# Patient Record
Sex: FEMALE | Race: WHITE
[De-identification: names, ages, dates, MRNs, and addresses within clinical notes are randomized per-mention and may not be internally consistent; named-entity substitution may affect disease eponyms.]

---

## 2017-05-28 ENCOUNTER — HOSPITAL ENCOUNTER (EMERGENCY)
Dept: HOSPITAL 24 - ER | Age: 67
Discharge: HOME | End: 2017-05-28
Payer: COMMERCIAL

## 2017-05-28 VITALS
DIASTOLIC BLOOD PRESSURE: 68 MMHG | DIASTOLIC BLOOD PRESSURE: 68 MMHG | SYSTOLIC BLOOD PRESSURE: 101 MMHG | SYSTOLIC BLOOD PRESSURE: 101 MMHG | SYSTOLIC BLOOD PRESSURE: 101 MMHG | SYSTOLIC BLOOD PRESSURE: 101 MMHG | SYSTOLIC BLOOD PRESSURE: 101 MMHG | DIASTOLIC BLOOD PRESSURE: 68 MMHG | DIASTOLIC BLOOD PRESSURE: 68 MMHG | DIASTOLIC BLOOD PRESSURE: 68 MMHG

## 2017-05-28 VITALS — BODY MASS INDEX: 22.4 KG/M2
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
## 2017-05-28 DIAGNOSIS — S39.012A: ICD-10-CM

## 2017-05-28 DIAGNOSIS — S23.3XXA: Primary | ICD-10-CM
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
## 2017-05-28 PROCEDURE — 96372 THER/PROPH/DIAG INJ SC/IM: CPT

## 2017-05-28 PROCEDURE — 72110 X-RAY EXAM L-2 SPINE 4/>VWS: CPT

## 2017-05-28 PROCEDURE — 99283 EMERGENCY DEPT VISIT LOW MDM: CPT

## 2017-05-28 PROCEDURE — 93005 ELECTROCARDIOGRAM TRACING: CPT

## 2017-05-28 PROCEDURE — 72072 X-RAY EXAM THORAC SPINE 3VWS: CPT

## 2017-05-28 PROCEDURE — 93010 ELECTROCARDIOGRAM REPORT: CPT

## 2017-05-28 PROCEDURE — 99282 EMERGENCY DEPT VISIT SF MDM: CPT

## 2017-05-28 NOTE — DR.GENAD
HPI





- PCP


Primary Care Physician: lizet lima





- HPI Comment


HPI Comment: INCREASING LOWER AND UPPER BACK PAIN. HISTORY OF BACK INJURY 

PREVIOUSLY.





- Complaint/Symptoms


Chief Complaint Doctors Comments: INJURY UPPER AND LOWER BACK 5 DAYS AGO.


Chief Complaint:: pt hurt back in the garden tuesday afternoon and it has got 

worse since then





- Nurses notes reviewed


Nurses Notes Review: Yes





- Source


History Provided: Patient





- Mode of Arrival


Mode of Arrival: Ambulatory





- Timing


Onset of Chief Complaint: 05/23/17


Came on: Suddenly





- Duration


Duration: Constant


Duration: Days





- Severity


Severity: Moderate





PMH





- PMH


Past Medical History: Yes


Past Medical History: Anxiety, Dyslipidemia, Migraines, Hypertension


Past Surgical History: Yes


Surgical History: Angioplasty/Stents, Cholecystectomy, Hysterectomy, Ortho 

Surgery





- Family History


History of Family Medical Conditions: Yes


Family Medical History: Cancer, Heart Failure, Hypertension





- Social History


Does patient currently use any type of tobacco product: No


Have you used tobacco products in the last 12 months: No


Type of Tobacco Use: None


Does any household member use tobacco: No


Alcohol Use: None


Do you use any recreational Drugs:: No


Lives With: Spouse


Lives Where: Home





- infectious screening


In the last 2 months have you had wt loss of >10#?: NO


Have you had fever, night sweats or hemotysis?: No


Have you traveled outside the country in the last 6 months?: No


Isolation: Standard





ROS





- Review of Systems


Constitutional: No Symptoms Reported.  negative: Chills, Fever


Eyes: No Symptoms Reported.  negative: Eye Pain, Discharge


ENTM: No Symptoms Reported.  negative: Ear Pain, Nose Discharge, Nose Congestion

, Throat Pain


Respiratoy: No Symptoms Reported.  negative: Productive Cough, Non-Productive 

Cough, Short of Breath, Wheezing, Hemoptysis


Cardiovascular: Chest Pain (BACK OF CHEST HURTING.)


Gastrointestinal/Abdominal: No Symptoms Reported.  negative: Abdominal Pain, 

Constipation, Nausea


Genitourinary: No Symptoms Reported





PE





- Vital Signs


Vitals: 


 





Temperature                      97.4 F


Pulse Rate                       69


Respiratory Rate                 18


Blood Pressure [Left Arm]        125/82


Blood Pressure                   101/68


O2 Sat by Pulse Oximetry         99











- Discharge Plan


Condition: Stable


Prescriptions: 


Cyclobenzaprine HCl [FLEXERIL 10 MG *] 10 mg PO TID #20 tab


Oxycodone/Acet 5 mg/325 mg [PERCOCET 5/325 MG *] 1 tab PO Q6H PRN #15 tab


 PRN Reason: Severe Pain





- Follow ups/Referrals


Follow ups/Referrals: 


HARPREET LIMA [Primary Care Provider] - 3 days





- Instructions


Instructions:  Thoracic Strain, Easy-to-Read, Lumbosacral Strain


Additional Instructions: 


RETURN TO ED IF WORSE.

## 2017-05-28 NOTE — RAD
HISTORY:  Low back pain.



Study: Five view lumbar spine series.



Comparison: November 3, 2014.



Findings:



There is mild dextroscoliosis of the lumbar spine similar to the comparison study. There is narrowin
g of the L5-S1 intervertebral disc space without significant endplate sclerosis. Facet joint arthrop
athy at L5-S1 is noted as well. Otherwise, vertebral body and disk space heights are maintained.  No
 evidence for acute fracture can be identified. Cholecystectomy clips project over the right upper q
uadrant of the abdomen. There is a small nonspecific metallic density projecting over the left pelvi
s. There are bilateral pelvic phleboliths.



IMPRESSION:

 

1. Dextroscoliosis of the lumbar spine with degenerative disc disease and facet joint arthropathy at
 L5-S1 similar to the comparison study. 



2. No acute or destructive bony abnormality is evident.



3. Small nonspecific metallic density projecting over the left pelvis. Clinical correlation is reque
sted.





Reported By:Electronically Signed by TESS RAMIREZ MD at 5/28/2017 12:49:30 PM

## 2017-05-28 NOTE — RAD
HISTORY:  Mid back pain.



Study: AP and lateral views of the thoracic spine including swimmer's view.



Comparison: November 3, 2014.



Findings:



Normal alignment of the thoracic spine is maintained. No destructive bony lesion is evident. There i
s moderate spondylosis of the mid thoracic spine. Vertebral body and disk space heights are maintain
ed. No evidence for acute fracture can be identified.



IMPRESSION:

 

Moderate spondylosis of the mid thoracic spine without acute or destructive bony abnormality evident
. 





Reported By:Electronically Signed by TESS RAMIREZ MD at 5/28/2017 12:45:58 PM

## 2017-11-02 ENCOUNTER — HOSPITAL ENCOUNTER (OUTPATIENT)
Dept: HOSPITAL 24 - RAD | Age: 67
End: 2017-11-02
Attending: NURSE PRACTITIONER
Payer: COMMERCIAL

## 2017-11-02 DIAGNOSIS — Z12.31: Primary | ICD-10-CM

## 2017-11-02 PROCEDURE — 77067 SCR MAMMO BI INCL CAD: CPT

## 2017-11-14 ENCOUNTER — HOSPITAL ENCOUNTER (OUTPATIENT)
Dept: HOSPITAL 24 - RAD | Age: 67
End: 2017-11-14
Attending: NURSE PRACTITIONER
Payer: COMMERCIAL

## 2017-11-14 DIAGNOSIS — R92.8: Primary | ICD-10-CM

## 2017-11-14 PROCEDURE — 77065 DX MAMMO INCL CAD UNI: CPT

## 2017-11-14 NOTE — MG
HISTORY:  Abnormal screening mammography with left breast developing asymmetry



Left breast digital diagnostic mammography with CAD. 



Comparison: Multiple priors dating back to October 27, 2010



FINDINGS: 



Spot magnification and mL views of the left breast were obtained.  Heterogeneously dense fibroglandul
ar tissue is seen to be present with the previously described asymmetry dispersing with intervening f
oci of lucency and assuming a similar appearance as compared to the more remote priors most compatibl
e with benign fibroglandular parenchyma.  No suspicious architectural distortion, mass or clustered m
icrocalcifications can be observed to suggest malignancy.  No skin thickening or nipple retraction is
 appreciated.   No pathological lymphadenopathy can be identified. 

 

IMPRESSION:  NO RADIOGRAPHIC EVIDENCE OF MALIGNANCY.  

 

ACR CATEGORY I - NEGATIVE EXAM.

 

FOLLOW-UP EXAM 1 YEAR. 



Diagnostic CAD was utilized and reviewed.



* 0 (ZERO) - ASSESSMENT INCOMPLETE; ADDITIONAL IMAGING IS NEEDED. 

* 1/1 (ONE) - NEGATIVE. 

* 2/II (TWO) - BENIGN FINDINGS.

 * 3/III (THREE) - PROBABLY BENIGN FINDING; SHORT INTERVAL FOLLOW-UP

SUGGESTED.

 * 4/IV (FOUR) - SUSPICIOUS ABNORMALITY; BIOPSY SHOULD BE CONSIDERED.

 * 5/V - HIGHLY SUSPICIOUS OF MALIGNANCY; BIOPSY SHOULD BE PERFORMED.

 

A NEGATIVE X-RAY REPORT SHOULD NOT DELAY BIOPSY IF A DOMINANT OR

CLINICALLY SUSPICIOUS MASS IS PRESENT; 4 TO 8 PERCENT OF CANCERS ARE NOT IDENTIFIED BY X-RAY.  A NEGA
TIVE REPORT MAY REINFORCE THE CLINICAL IMPRESSION.  ADENOSIS AND DENSE BREASTS MAY OBSCURE AN UNDERLY
ING NEOPLASM.







Reported By:Electronically Signed by BELLE FALK MD at 11/14/2017 2:48:46 PM

## 2018-02-12 ENCOUNTER — HOSPITAL ENCOUNTER (OUTPATIENT)
Dept: HOSPITAL 24 - RAD | Age: 68
End: 2018-02-12
Attending: NURSE PRACTITIONER
Payer: COMMERCIAL

## 2018-02-12 DIAGNOSIS — K59.09: ICD-10-CM

## 2018-02-12 DIAGNOSIS — R10.84: Primary | ICD-10-CM

## 2018-02-12 PROCEDURE — 74018 RADEX ABDOMEN 1 VIEW: CPT

## 2018-02-12 NOTE — RAD
Examination: KUB



History: Abdominal pain



Findings: Nonobstructive intestinal gas pattern. No ascites, mass formation or pathologic calcificati
on identified. Surgical clips right upper abdomen. No visceral enlargement.



Impression: No acute abdominal process demonstrated.



Reported By:Electronically Signed by MANSOOR SHERMAN MD at 2/12/2018 7:06:07 PM

## 2018-02-16 ENCOUNTER — HOSPITAL ENCOUNTER (OUTPATIENT)
Dept: HOSPITAL 24 - RAD | Age: 68
End: 2018-02-16
Attending: NURSE PRACTITIONER
Payer: COMMERCIAL

## 2018-02-16 DIAGNOSIS — R53.83: ICD-10-CM

## 2018-02-16 DIAGNOSIS — K59.09: ICD-10-CM

## 2018-02-16 DIAGNOSIS — R10.84: Primary | ICD-10-CM

## 2018-02-16 LAB
ALBUMIN SERPL BCP-MCNC: 3.1 G/DL (ref 3.4–5)
ALP SERPL-CCNC: 121 UNITS/L (ref 46–116)
ALT SERPL W P-5'-P-CCNC: 18 UNITS/L (ref 12–78)
AST SERPL W P-5'-P-CCNC: 15 UNITS/L (ref 15–37)
BUN SERPL-MCNC: 22 MG/DL (ref 7–18)
CALCIUM ALBUM COR SERPL-SCNC: (no result) MMOL/L (ref 136–145)
CALCIUM ALBUM COR SERPL-SCNC: 9.5 MG/DL (ref 8.5–10.1)
CALCIUM SERPL-MCNC: 8.8 MG/DL (ref 8.5–10.1)
CHLORIDE SERPL-SCNC: 103 MMOL/L (ref 98–107)
CO2 SERPL-SCNC: 32.4 MMOL/L (ref 21–32)
CREAT SERPL-MCNC: 0.97 MG/DL (ref 0.55–1.02)
EGFR  BLACK RACES: > 60 (ref 60–?)
PROT SERPL-MCNC: 6.6 G/DL (ref 6.4–8.2)
SODIUM SERPL-SCNC: 140 MMOL/L (ref 136–145)

## 2018-02-16 PROCEDURE — 36415 COLL VENOUS BLD VENIPUNCTURE: CPT

## 2018-02-16 PROCEDURE — 74177 CT ABD & PELVIS W/CONTRAST: CPT

## 2018-02-16 PROCEDURE — 80053 COMPREHEN METABOLIC PANEL: CPT

## 2018-02-16 NOTE — CT
HISTORY: Abdominal pain, nausea, and constipation.



Study: CT abdomen and pelvis with contrast



Comparison: CT abdomen/pelvis dated August 7, 2016.



Technique:



Multiple axial images of the abdomen and pelvis were obtained from the lung bases to the pubic symphy
sis after the administration of IV contrast.  Dose reduction techniques including Automated Exposure 
Control (AEC) and adjustment of mA and kV were utilized.





Findings:



The visualized portions of the lung bases are unremarkable.  Multiple hepatic cysts are again seen. O
therwise, the liver, spleen, pancreas, kidneys, and adrenal glands are unremarkable in their CT appea
gabriel. The gallbladder is surgically absent.  No significant mesenteric lymphadenopathy or stranding 
can be observed.  No free fluid or free air is seen within the abdomen.  The large and small bowel ar
e unremarkable. The appendix is not well seen. The uterus and ovaries are surgically absent. The blad
[place] is collapsed and not well evaluated. The osseous structures are intact. No aggressive osseous les
ions.



IMPRESSION:  No CT evidence of acute abdominal/pelvic pathology.







Reported By:Electronically Signed by TATE LOPEZ MD at 2/16/2018 1:48:40 PM

## 2018-04-30 ENCOUNTER — HOSPITAL ENCOUNTER (EMERGENCY)
Dept: HOSPITAL 24 - ER | Age: 68
Discharge: HOME | End: 2018-04-30
Payer: COMMERCIAL

## 2018-04-30 VITALS — BODY MASS INDEX: 26.6 KG/M2

## 2018-04-30 VITALS — SYSTOLIC BLOOD PRESSURE: 159 MMHG | DIASTOLIC BLOOD PRESSURE: 72 MMHG

## 2018-04-30 DIAGNOSIS — I10: ICD-10-CM

## 2018-04-30 DIAGNOSIS — R51: Primary | ICD-10-CM

## 2018-04-30 DIAGNOSIS — H11.31: ICD-10-CM

## 2018-04-30 PROCEDURE — 99283 EMERGENCY DEPT VISIT LOW MDM: CPT

## 2018-04-30 PROCEDURE — 96372 THER/PROPH/DIAG INJ SC/IM: CPT

## 2018-04-30 PROCEDURE — 99282 EMERGENCY DEPT VISIT SF MDM: CPT

## 2018-04-30 PROCEDURE — 70450 CT HEAD/BRAIN W/O DYE: CPT

## 2018-04-30 NOTE — CT
HISTORY: Headache and high blood pressure.



Study:  CT brain without contrast



Comparison: CT head dated April 27, 2015.



Technique:

Multiple axial images of the brain were obtained from the skull base to the vertex without administra
tion of IV contrast.  Dose reduction techniques including Automated Exposure Control (AEC) and adjust
ment of mA and kV were utilized.





Findings: 



Age-related cortical atrophy and chronic small vessel ischemic changes. No acute intraparenchymal hem
orrhage or mass can be identified.  No extra-axial fluid collections are seen.  No alteration in the 
attenuation of the brain parenchyma can be identified to suggest acute or subacute ischemic change.  
The ventricular system is symmetric and nondilated.  The extracranial structures are grossly unremark
able.



IMPRESSION: No acute intracranial pathology.



Reported By:Electronically Signed by TATE LOPEZ MD at 4/30/2018 4:05:31 PM

## 2018-04-30 NOTE — DR.EXTPAIN
HPI





- Time seen


Time seen: 15:30





- PCP


Primary Care Physician: lizet lima





- HPI Comment


HPI Comment: HISTORY AS BELOW.





- Complaint/Symptoms


Chief Complaint Doctor Comments: RIGHT SUBCONJUCTIVA HEMORRHAGE AND SEVERE 

HEADACHE SINCE LAST NIGHT. SAME HAPPEN TO LT EYE RECENTLY. SAW EYE DOCTOR THEN. 

BP HIGH IN ED. NO TRAUMA.


Chief Complaint:: pt stated her bp was high last night and a blood vessel 

busted in her right eye and she has a bad headache.


Self Treatment fo Chief Complaint: pt stated she took her bp meds





- Nurses notes reviewed


Nurses Notes Review: Yes





- Source


History Provided: Patient





- Mode of arrival


Mode of Arrival: Ambulatory





- Timing


Onset of Chief Complaint: 04/29/18





- Context


History of: Arthritis





- Associated signs and symptoms


Associated Signs and Symptoms: Pain (HEADACHE), Nausea, Shortness of Breath, 

Other (RIGHT SUBCONJUNCTIVA HEMORRHAGE.)





PMH





- PMH


Past Medical History: Yes


Past Medical History: Anxiety, Dyslipidemia, Migraines, Hypertension


Past Surgical History: Yes


Surgical History: Angioplasty/Stents, Cholecystectomy, Hysterectomy, Ortho 

Surgery





- Family History


History of Family Medical Conditions: Yes


Family Medical History: Cancer, Heart Failure, Hypertension





- Social History


Does patient currently use any type of tobacco product: No


Have you used tobacco products in the last 12 months: No


Type of Tobacco Use: None


Does any household member use tobacco: No


Alcohol Use: None


Do you use any recreational Drugs:: No


Lives With: Family


Lives Where: Home





- infectious screening


In the last 2 months have you had wt loss of >10#?: NO


Have you had fever, night sweats or hemotysis?: No


Have you traveled outside the country in the last 6 months?: No


Isolation: Standard





ROS





- Review of Systems


Constitutional: No Symptoms Reported.  negative: Chills, Fever


Eyes: negative: Eye Pain, Blurred Vision, Discharge


ENTM: Nose Congestion.  negative: Ear Pain, Nose Discharge, Throat Pain


Respiratoy: Non-Productive Cough, Short of Breath.  negative: Wheezing, 

Hemoptysis


Cardiovascular: No Symptoms Reported


Gastrointestinal/Abdominal: Nausea, Vomiting.  negative: Abdominal Pain


Genitourinary: No Symptoms Reported


Neurological: Headache, Weakness, Dizziness


Musculoskeletal: Muscle Pain


Integumentary: No Symptoms Reported


Hematologic/Lymphatic: No Symptoms Reported


Endocrine: No Symptoms Reported


All Other Systems: Reviewed and Negative





PE





- Vital Signs


Vitals: 


 





Temperature                      98.3 F


Pulse Rate [Left Brachial]       59


Pulse Rate                       64


Respiratory Rate                 16


Blood Pressure [Left Arm]        159/72


Blood Pressure                   136/81


O2 Sat by Pulse Oximetry         99











- General


Limitations: No Limitations


General Appearance: Alert





- Head


Head Exam: Normal Inspection





- Eyes


Eye exam: EOMI, Other (RT SUBCONJUNCTIVA HEMORRHAGE)





- ENT


ENT Exam: Normal  External Ear Exam





- Neck


Neck Exam: Normal Inspection, Trachea Midline





- Chest


Chest Inspection: Normal Inspection





- Respiratory


Respiratory Exam: Normal Lung Sounds Bilat


Respiratory Exam: Bilateral Rhonchi, Lower Rhonchi





- Cardiovascular


Cardiovascular Exam: Regular Rate, Normal Rhythm, Normal Heart Sounds





- Abdominal Exam


Abdominal Exam: Normal Bowel Sounds, Soft.  negative: Tenderness





- Extremities


Extremities Exam: Normal Inspection





- Lower Extremities


Neurovascular/Tendon Exam: Normal Capillary Refill


Gait Exam: Observed and Normal





- Back


Back Exam: Normal Inspection





- Neurological


Neurological Exam: Alert, Oriented X3





- Psychiatric


Psychiatric Exam: Anxious





- Skin


Skin Exam: Warm, Erythema





MDM





- Differential Diagnosis


Differential Diagnosis: Other (SUBCONJUNCTIVA HEMORRHAGE, HEADACHE)





Course





- Treatment


Treatment: SEE ORDERS. BP IMPROVED WITH MEDS GIVEN IN ED.





- Reevaluation


1st: Improved





- Education/Counseling


Education/Counseling: Patient, Education


Educated On: Diagnosis, Needs for Follow Up





ROR





- XRAY


XRAY Interpreted by: Radiologist


XRAY Findings: REPORT DISCUSS WITH PATIENT.





- Diagnosis


Discharge Problem: 


Headache


Qualifiers:


 Headache type: unspecified Headache chronicity pattern: acute headache 

Intractability: intractable Qualified Code(s): R51 - Headache





Subconjunctival hematoma


Qualifiers:


 Laterality: right Qualified Code(s): H11.31 - Conjunctival hemorrhage, right 

eye





HTN (hypertension)


Qualifiers:


 Hypertension type: essential hypertension Qualified Code(s): I10 - Essential (

primary) hypertension








- Discharge Plan


Disposition: 01 HOME, SELF-CARE


Condition: Stable


Prescriptions: 


Butalbital-Acet-Caffeine [Fioricet Tab] 1 tab PO Q8H PRN #15 tab


 PRN Reason: Migraine Headache


Clonidine HCl [CATAPRES 0.1 MG TAB *] 0.1 mg PO Q24H PRN #10 tab


 PRN Reason: 





- Follow ups/Referrals


Follow ups/Referrals: 


HARPREET LIMA [Primary Care Provider] - 1 day


LUANNE CLEARY [CONSULTING PHYSICIAN] - 1 day





- Instructions


Instructions:  Migraine Headache, Easy-to-Read, Hypertension, Easy-to-Read, 

Subconjunctival Hemorrhage


Additional Instructions: 


RETUEN TO ED IF WORSE. DISCUSS WITH YOUR DOCTOR IN AM BEFORE TAKEN PLAVIX, 

ASPRIN AND MELOXICAM.
